# Patient Record
Sex: FEMALE | Race: WHITE | NOT HISPANIC OR LATINO | Employment: FULL TIME | URBAN - METROPOLITAN AREA
[De-identification: names, ages, dates, MRNs, and addresses within clinical notes are randomized per-mention and may not be internally consistent; named-entity substitution may affect disease eponyms.]

---

## 2018-12-07 ENCOUNTER — LAB REQUISITION (OUTPATIENT)
Dept: LAB | Facility: HOSPITAL | Age: 28
End: 2018-12-07

## 2018-12-07 DIAGNOSIS — N92.6 IRREGULAR MENSTRUATION: ICD-10-CM

## 2018-12-07 PROCEDURE — 88305 TISSUE EXAM BY PATHOLOGIST: CPT | Performed by: OBSTETRICS & GYNECOLOGY

## 2018-12-08 LAB
CYTO UR: NORMAL
LAB AP CASE REPORT: NORMAL
LAB AP CLINICAL INFORMATION: NORMAL
PATH REPORT.FINAL DX SPEC: NORMAL
PATH REPORT.GROSS SPEC: NORMAL

## 2022-08-11 ENCOUNTER — TRANSCRIBE ORDERS (OUTPATIENT)
Dept: OBSTETRICS AND GYNECOLOGY | Facility: HOSPITAL | Age: 32
End: 2022-08-11

## 2022-08-11 DIAGNOSIS — Z78.9 CONCEIVED BY IN VITRO FERTILIZATION: Primary | ICD-10-CM

## 2022-08-11 DIAGNOSIS — O99.350 MATERNAL SEIZURE DISORDER: ICD-10-CM

## 2022-08-11 DIAGNOSIS — N80.9 ENDOMETRIOSIS: ICD-10-CM

## 2022-08-11 DIAGNOSIS — O09.299 HX OF PREECLAMPSIA, PRIOR PREGNANCY, CURRENTLY PREGNANT: ICD-10-CM

## 2022-08-11 DIAGNOSIS — G40.909 MATERNAL SEIZURE DISORDER: ICD-10-CM

## 2022-08-11 DIAGNOSIS — Z86.2 HISTORY OF THROMBOCYTOPENIA: ICD-10-CM

## 2022-08-30 ENCOUNTER — HOSPITAL ENCOUNTER (OUTPATIENT)
Dept: WOMENS IMAGING | Facility: HOSPITAL | Age: 32
Discharge: HOME | End: 2022-08-30
Admitting: OBSTETRICS & GYNECOLOGY

## 2022-08-30 ENCOUNTER — OFFICE VISIT (OUTPATIENT)
Dept: OBSTETRICS AND GYNECOLOGY | Facility: HOSPITAL | Age: 32
End: 2022-08-30

## 2022-08-30 VITALS
SYSTOLIC BLOOD PRESSURE: 116 MMHG | WEIGHT: 136 LBS | DIASTOLIC BLOOD PRESSURE: 74 MMHG | BODY MASS INDEX: 21.35 KG/M2 | HEIGHT: 67 IN

## 2022-08-30 DIAGNOSIS — O35.9XX0 TERATOGEN EXPOSURE IN CURRENT PREGNANCY, SINGLE OR UNSPECIFIED FETUS: ICD-10-CM

## 2022-08-30 DIAGNOSIS — O09.299 HX OF PREECLAMPSIA, PRIOR PREGNANCY, CURRENTLY PREGNANT: ICD-10-CM

## 2022-08-30 DIAGNOSIS — Z78.9 CONCEIVED BY IN VITRO FERTILIZATION: ICD-10-CM

## 2022-08-30 DIAGNOSIS — N80.9 ENDOMETRIOSIS: ICD-10-CM

## 2022-08-30 DIAGNOSIS — Z86.2 HISTORY OF THROMBOCYTOPENIA: ICD-10-CM

## 2022-08-30 DIAGNOSIS — G40.909 MATERNAL SEIZURE DISORDER: ICD-10-CM

## 2022-08-30 DIAGNOSIS — O09.819 PREGNANCY RESULTING FROM IN VITRO FERTILIZATION, ANTEPARTUM: Primary | ICD-10-CM

## 2022-08-30 DIAGNOSIS — Z34.90 PREGNANCY, UNSPECIFIED GESTATIONAL AGE: ICD-10-CM

## 2022-08-30 DIAGNOSIS — G40.909 SEIZURE DISORDER DURING PREGNANCY, ANTEPARTUM: ICD-10-CM

## 2022-08-30 DIAGNOSIS — O99.350 MATERNAL SEIZURE DISORDER: ICD-10-CM

## 2022-08-30 DIAGNOSIS — O99.350 SEIZURE DISORDER DURING PREGNANCY, ANTEPARTUM: ICD-10-CM

## 2022-08-30 PROCEDURE — 99202 OFFICE O/P NEW SF 15 MIN: CPT | Performed by: OBSTETRICS & GYNECOLOGY

## 2022-08-30 PROCEDURE — 76801 OB US < 14 WKS SINGLE FETUS: CPT | Performed by: OBSTETRICS & GYNECOLOGY

## 2022-08-30 PROCEDURE — 76813 OB US NUCHAL MEAS 1 GEST: CPT

## 2022-08-30 PROCEDURE — 76801 OB US < 14 WKS SINGLE FETUS: CPT

## 2022-08-30 PROCEDURE — 76813 OB US NUCHAL MEAS 1 GEST: CPT | Performed by: OBSTETRICS & GYNECOLOGY

## 2022-08-30 RX ORDER — ONDANSETRON 4 MG/1
TABLET, FILM COATED ORAL TAKE AS DIRECTED
COMMUNITY
End: 2023-01-18

## 2022-08-30 RX ORDER — PROGESTERONE 200 MG/1
CAPSULE ORAL 4 TIMES DAILY
COMMUNITY
Start: 2022-08-17 | End: 2023-01-18

## 2022-08-30 RX ORDER — LEVETIRACETAM 500 MG/1
500 TABLET, EXTENDED RELEASE ORAL DAILY
COMMUNITY
Start: 2022-08-11

## 2022-08-30 RX ORDER — HYDROXYZINE HYDROCHLORIDE 10 MG/1
TABLET, FILM COATED ORAL TAKE AS DIRECTED
COMMUNITY
End: 2023-01-18

## 2022-08-30 RX ORDER — ESTRADIOL 2 MG/1
2 TABLET ORAL 3 TIMES DAILY
COMMUNITY
Start: 2022-08-17 | End: 2023-01-18

## 2022-08-30 NOTE — PROGRESS NOTES
"Pt denies problems with pregnancy.  Reports \"no vaginal bleeding in the last 3 weeks.\"  Next OB appt 9/21/22.  \"Preimplantation genetics were normal and female.\"  "

## 2022-10-12 ENCOUNTER — HOSPITAL ENCOUNTER (OUTPATIENT)
Dept: WOMENS IMAGING | Facility: HOSPITAL | Age: 32
Discharge: HOME | End: 2022-10-12
Admitting: OBSTETRICS & GYNECOLOGY

## 2022-10-12 ENCOUNTER — OFFICE VISIT (OUTPATIENT)
Dept: OBSTETRICS AND GYNECOLOGY | Facility: HOSPITAL | Age: 32
End: 2022-10-12

## 2022-10-12 VITALS — SYSTOLIC BLOOD PRESSURE: 122 MMHG | BODY MASS INDEX: 23.18 KG/M2 | DIASTOLIC BLOOD PRESSURE: 74 MMHG | WEIGHT: 148 LBS

## 2022-10-12 DIAGNOSIS — O35.9XX0 TERATOGEN EXPOSURE IN CURRENT PREGNANCY, SINGLE OR UNSPECIFIED FETUS: ICD-10-CM

## 2022-10-12 DIAGNOSIS — Z34.90 PREGNANCY, UNSPECIFIED GESTATIONAL AGE: ICD-10-CM

## 2022-10-12 DIAGNOSIS — G40.909 SEIZURE DISORDER DURING PREGNANCY, ANTEPARTUM: ICD-10-CM

## 2022-10-12 DIAGNOSIS — O99.350 SEIZURE DISORDER DURING PREGNANCY, ANTEPARTUM: ICD-10-CM

## 2022-10-12 DIAGNOSIS — O09.819 PREGNANCY RESULTING FROM IN VITRO FERTILIZATION, ANTEPARTUM: ICD-10-CM

## 2022-10-12 DIAGNOSIS — O09.819 PREGNANCY RESULTING FROM IN-VITRO FERTILIZATION: ICD-10-CM

## 2022-10-12 DIAGNOSIS — O35.9XX0 TERATOGEN EXPOSURE IN CURRENT PREGNANCY, SINGLE OR UNSPECIFIED FETUS: Primary | ICD-10-CM

## 2022-10-12 PROCEDURE — 76811 OB US DETAILED SNGL FETUS: CPT | Performed by: OBSTETRICS & GYNECOLOGY

## 2022-10-12 PROCEDURE — 76811 OB US DETAILED SNGL FETUS: CPT

## 2022-11-09 ENCOUNTER — OFFICE VISIT (OUTPATIENT)
Dept: OBSTETRICS AND GYNECOLOGY | Facility: HOSPITAL | Age: 32
End: 2022-11-09

## 2022-11-09 ENCOUNTER — HOSPITAL ENCOUNTER (OUTPATIENT)
Dept: WOMENS IMAGING | Facility: HOSPITAL | Age: 32
Discharge: HOME | End: 2022-11-09
Admitting: OBSTETRICS & GYNECOLOGY

## 2022-11-09 VITALS — SYSTOLIC BLOOD PRESSURE: 113 MMHG | DIASTOLIC BLOOD PRESSURE: 73 MMHG | WEIGHT: 158.4 LBS | BODY MASS INDEX: 24.81 KG/M2

## 2022-11-09 DIAGNOSIS — O35.9XX0 TERATOGEN EXPOSURE IN CURRENT PREGNANCY, SINGLE OR UNSPECIFIED FETUS: ICD-10-CM

## 2022-11-09 DIAGNOSIS — Z34.90 PREGNANCY, UNSPECIFIED GESTATIONAL AGE: ICD-10-CM

## 2022-11-09 DIAGNOSIS — O09.819 PREGNANCY RESULTING FROM IN-VITRO FERTILIZATION: ICD-10-CM

## 2022-11-09 DIAGNOSIS — O09.819 PREGNANCY RESULTING FROM IN-VITRO FERTILIZATION: Primary | ICD-10-CM

## 2022-11-09 PROCEDURE — 76816 OB US FOLLOW-UP PER FETUS: CPT | Performed by: OBSTETRICS & GYNECOLOGY

## 2022-11-09 PROCEDURE — 76825 ECHO EXAM OF FETAL HEART: CPT

## 2022-11-09 PROCEDURE — 76825 ECHO EXAM OF FETAL HEART: CPT | Performed by: OBSTETRICS & GYNECOLOGY

## 2022-11-09 PROCEDURE — 76816 OB US FOLLOW-UP PER FETUS: CPT

## 2022-11-09 PROCEDURE — 93325 DOPPLER ECHO COLOR FLOW MAPG: CPT

## 2022-11-09 PROCEDURE — 93325 DOPPLER ECHO COLOR FLOW MAPG: CPT | Performed by: OBSTETRICS & GYNECOLOGY

## 2022-12-21 ENCOUNTER — OFFICE VISIT (OUTPATIENT)
Dept: OBSTETRICS AND GYNECOLOGY | Facility: HOSPITAL | Age: 32
End: 2022-12-21

## 2022-12-21 ENCOUNTER — HOSPITAL ENCOUNTER (OUTPATIENT)
Dept: WOMENS IMAGING | Facility: HOSPITAL | Age: 32
Discharge: HOME | End: 2022-12-21
Admitting: OBSTETRICS & GYNECOLOGY

## 2022-12-21 VITALS — SYSTOLIC BLOOD PRESSURE: 115 MMHG | WEIGHT: 170.8 LBS | BODY MASS INDEX: 26.75 KG/M2 | DIASTOLIC BLOOD PRESSURE: 77 MMHG

## 2022-12-21 DIAGNOSIS — O35.9XX0 TERATOGEN EXPOSURE IN CURRENT PREGNANCY, SINGLE OR UNSPECIFIED FETUS: ICD-10-CM

## 2022-12-21 DIAGNOSIS — O44.43 LOW-LYING PLACENTA WITHOUT HEMORRHAGE, THIRD TRIMESTER: ICD-10-CM

## 2022-12-21 DIAGNOSIS — Z34.90 PREGNANCY, UNSPECIFIED GESTATIONAL AGE: ICD-10-CM

## 2022-12-21 DIAGNOSIS — O24.410 DIET CONTROLLED GESTATIONAL DIABETES MELLITUS (GDM) IN THIRD TRIMESTER: ICD-10-CM

## 2022-12-21 DIAGNOSIS — O09.819 PREGNANCY RESULTING FROM IN-VITRO FERTILIZATION: ICD-10-CM

## 2022-12-21 DIAGNOSIS — O09.819 PREGNANCY RESULTING FROM IN-VITRO FERTILIZATION: Primary | ICD-10-CM

## 2022-12-21 PROCEDURE — 76816 OB US FOLLOW-UP PER FETUS: CPT | Performed by: OBSTETRICS & GYNECOLOGY

## 2022-12-21 PROCEDURE — 76819 FETAL BIOPHYS PROFIL W/O NST: CPT

## 2022-12-21 PROCEDURE — 76819 FETAL BIOPHYS PROFIL W/O NST: CPT | Performed by: OBSTETRICS & GYNECOLOGY

## 2022-12-21 PROCEDURE — 76816 OB US FOLLOW-UP PER FETUS: CPT

## 2022-12-27 ENCOUNTER — TELEPHONE (OUTPATIENT)
Dept: OBSTETRICS AND GYNECOLOGY | Facility: HOSPITAL | Age: 32
End: 2022-12-27

## 2022-12-27 NOTE — TELEPHONE ENCOUNTER
"Attempted to call patient in response to her question about covid. Unable to leave message due to \"mailbox full\".   "

## 2022-12-27 NOTE — TELEPHONE ENCOUNTER
"Attempted to call pt again about her + covid diagnosis and question.  No answer and unable to leave message due to \"mailbox full\"   "

## 2023-01-09 ENCOUNTER — TELEPHONE (OUTPATIENT)
Dept: OBSTETRICS AND GYNECOLOGY | Facility: HOSPITAL | Age: 33
End: 2023-01-09
Payer: MEDICAID

## 2023-01-09 NOTE — TELEPHONE ENCOUNTER
Spoke with patient. States her OB is doing weekly NSTs and wants to know if we could do one at her appointment here on 1/18/23 to save her another appointment with her OB. Advised her that NST could be discussed during her visit here; that would need to be determined by the physician. She states she was seen at her local hospital recently with one episode of bright red spotting that quickly resolved. Reports \"constant menstrual-like cramping\" that her OB has told her \"is probably normal\". Encouraged her to report any new or persistent symptoms or concerns to her OB. She v/u and agreed.

## 2023-01-18 ENCOUNTER — HOSPITAL ENCOUNTER (OUTPATIENT)
Dept: WOMENS IMAGING | Facility: HOSPITAL | Age: 33
Discharge: HOME | End: 2023-01-18
Admitting: OBSTETRICS & GYNECOLOGY
Payer: COMMERCIAL

## 2023-01-18 ENCOUNTER — OFFICE VISIT (OUTPATIENT)
Dept: OBSTETRICS AND GYNECOLOGY | Facility: HOSPITAL | Age: 33
End: 2023-01-18
Payer: COMMERCIAL

## 2023-01-18 VITALS — SYSTOLIC BLOOD PRESSURE: 131 MMHG | BODY MASS INDEX: 27.35 KG/M2 | WEIGHT: 174.6 LBS | DIASTOLIC BLOOD PRESSURE: 87 MMHG

## 2023-01-18 DIAGNOSIS — O24.410 DIET CONTROLLED GESTATIONAL DIABETES MELLITUS (GDM) IN THIRD TRIMESTER: ICD-10-CM

## 2023-01-18 DIAGNOSIS — O09.819 PREGNANCY RESULTING FROM IN-VITRO FERTILIZATION: ICD-10-CM

## 2023-01-18 DIAGNOSIS — O44.43 LOW-LYING PLACENTA WITHOUT HEMORRHAGE, THIRD TRIMESTER: ICD-10-CM

## 2023-01-18 DIAGNOSIS — Z34.90 PREGNANCY, UNSPECIFIED GESTATIONAL AGE: ICD-10-CM

## 2023-01-18 DIAGNOSIS — O09.819 PREGNANCY RESULTING FROM IN-VITRO FERTILIZATION: Primary | ICD-10-CM

## 2023-01-18 DIAGNOSIS — O35.9XX0 TERATOGEN EXPOSURE IN CURRENT PREGNANCY, SINGLE OR UNSPECIFIED FETUS: ICD-10-CM

## 2023-01-18 PROCEDURE — 76816 OB US FOLLOW-UP PER FETUS: CPT | Performed by: OBSTETRICS & GYNECOLOGY

## 2023-01-18 PROCEDURE — 76819 FETAL BIOPHYS PROFIL W/O NST: CPT

## 2023-01-18 PROCEDURE — 76819 FETAL BIOPHYS PROFIL W/O NST: CPT | Performed by: OBSTETRICS & GYNECOLOGY

## 2023-01-18 PROCEDURE — 76816 OB US FOLLOW-UP PER FETUS: CPT

## 2023-01-18 NOTE — PROGRESS NOTES
"Pt reports + covid on 12/23/22, HA/sinus pain have never went away, increased bp at home 142//93,had episode of bright red bleeding x1 2 weeks ago, went to [facility] they said 1 cm dialated, pt reports abdomen \"sore\" and increased cramping,  + GDM  Blood sugars doing \"ok\", Next f/u with Ang 1/20/23, Preconceptual genetics normal   "